# Patient Record
Sex: FEMALE | Race: WHITE | Employment: FULL TIME | URBAN - METROPOLITAN AREA
[De-identification: names, ages, dates, MRNs, and addresses within clinical notes are randomized per-mention and may not be internally consistent; named-entity substitution may affect disease eponyms.]

---

## 2018-01-01 ENCOUNTER — HOSPICE ADMISSION (OUTPATIENT)
Dept: HOSPICE | Facility: HOSPICE | Age: 68
End: 2018-01-01
Payer: OTHER MISCELLANEOUS

## 2018-01-01 ENCOUNTER — HOSPITAL ENCOUNTER (INPATIENT)
Age: 68
LOS: 4 days | End: 2018-03-09
Attending: INTERNAL MEDICINE | Admitting: INTERNAL MEDICINE

## 2018-01-01 VITALS
TEMPERATURE: 103.9 F | SYSTOLIC BLOOD PRESSURE: 99 MMHG | DIASTOLIC BLOOD PRESSURE: 59 MMHG | HEART RATE: 112 BPM | RESPIRATION RATE: 12 BRPM

## 2018-01-01 PROCEDURE — 74011250636 HC RX REV CODE- 250/636: Performed by: NURSE PRACTITIONER

## 2018-01-01 PROCEDURE — 74011000250 HC RX REV CODE- 250: Performed by: NURSE PRACTITIONER

## 2018-01-01 PROCEDURE — 74011250637 HC RX REV CODE- 250/637: Performed by: NURSE PRACTITIONER

## 2018-01-01 PROCEDURE — 0656 HSPC GENERAL INPATIENT

## 2018-01-01 PROCEDURE — 3336500001 HSPC ELECTION

## 2018-01-01 RX ORDER — GLYCOPYRROLATE 0.2 MG/ML
0.2 INJECTION INTRAMUSCULAR; INTRAVENOUS
Status: DISCONTINUED | OUTPATIENT
Start: 2018-01-01 | End: 2018-01-01 | Stop reason: HOSPADM

## 2018-01-01 RX ORDER — ONDANSETRON HYDROCHLORIDE 8 MG/1
8 TABLET, FILM COATED ORAL
Status: ON HOLD | COMMUNITY
End: 2018-01-01

## 2018-01-01 RX ORDER — MORPHINE SULFATE 10 MG/ML
5 INJECTION, SOLUTION INTRAMUSCULAR; INTRAVENOUS
Status: DISCONTINUED | OUTPATIENT
Start: 2018-01-01 | End: 2018-01-01 | Stop reason: HOSPADM

## 2018-01-01 RX ORDER — MORPHINE SULFATE 30 MG/1
30 CAPSULE, EXTENDED RELEASE ORAL EVERY 12 HOURS
Status: ON HOLD | COMMUNITY
End: 2018-01-01

## 2018-01-01 RX ORDER — SODIUM CHLORIDE 0.9 % (FLUSH) 0.9 %
10 SYRINGE (ML) INJECTION AS NEEDED
Status: DISCONTINUED | OUTPATIENT
Start: 2018-01-01 | End: 2018-01-01 | Stop reason: HOSPADM

## 2018-01-01 RX ORDER — HEPARIN 100 UNIT/ML
300 SYRINGE INTRAVENOUS EVERY 12 HOURS
Status: DISCONTINUED | OUTPATIENT
Start: 2018-01-01 | End: 2018-01-01 | Stop reason: HOSPADM

## 2018-01-01 RX ORDER — HALOPERIDOL 5 MG/ML
2 INJECTION INTRAMUSCULAR
Status: DISCONTINUED | OUTPATIENT
Start: 2018-01-01 | End: 2018-01-01 | Stop reason: HOSPADM

## 2018-01-01 RX ORDER — LORAZEPAM 2 MG/ML
2 INJECTION INTRAMUSCULAR
Status: DISCONTINUED | OUTPATIENT
Start: 2018-01-01 | End: 2018-01-01 | Stop reason: HOSPADM

## 2018-01-01 RX ORDER — LORAZEPAM 1 MG/1
.5-1 TABLET ORAL
Status: ON HOLD | COMMUNITY
End: 2018-01-01

## 2018-01-01 RX ORDER — FACIAL-BODY WIPES
10 EACH TOPICAL AS NEEDED
Status: DISCONTINUED | OUTPATIENT
Start: 2018-01-01 | End: 2018-01-01

## 2018-01-01 RX ORDER — GABAPENTIN 300 MG/1
300 CAPSULE ORAL 3 TIMES DAILY
Status: ON HOLD | COMMUNITY
End: 2018-01-01

## 2018-01-01 RX ORDER — MORPHINE SULFATE 20 MG/ML
5 SOLUTION ORAL
COMMUNITY

## 2018-01-01 RX ORDER — SODIUM CHLORIDE 0.9 % (FLUSH) 0.9 %
10 SYRINGE (ML) INJECTION EVERY 12 HOURS
Status: DISCONTINUED | OUTPATIENT
Start: 2018-01-01 | End: 2018-01-01 | Stop reason: HOSPADM

## 2018-01-01 RX ORDER — ACETAMINOPHEN 650 MG/1
650 SUPPOSITORY RECTAL
Status: DISCONTINUED | OUTPATIENT
Start: 2018-01-01 | End: 2018-01-01 | Stop reason: HOSPADM

## 2018-01-01 RX ORDER — LORAZEPAM 2 MG/ML
2 CONCENTRATE ORAL
COMMUNITY

## 2018-01-01 RX ORDER — DIPHENOXYLATE HYDROCHLORIDE AND ATROPINE SULFATE 2.5; .025 MG/1; MG/1
1-2 TABLET ORAL
Status: ON HOLD | COMMUNITY
End: 2018-01-01

## 2018-01-01 RX ORDER — OXYCODONE HYDROCHLORIDE 10 MG/1
10 TABLET ORAL
Status: ON HOLD | COMMUNITY
End: 2018-01-01

## 2018-01-01 RX ORDER — FENTANYL 50 UG/1
1 PATCH TRANSDERMAL
Status: DISCONTINUED | OUTPATIENT
Start: 2018-01-01 | End: 2018-01-01 | Stop reason: HOSPADM

## 2018-01-01 RX ORDER — HEPARIN 100 UNIT/ML
300 SYRINGE INTRAVENOUS AS NEEDED
Status: DISCONTINUED | OUTPATIENT
Start: 2018-01-01 | End: 2018-01-01 | Stop reason: HOSPADM

## 2018-01-01 RX ORDER — FENTANYL 25 UG/1
1 PATCH TRANSDERMAL
COMMUNITY

## 2018-01-01 RX ADMIN — LORAZEPAM 2 MG: 2 INJECTION INTRAMUSCULAR; INTRAVENOUS at 03:46

## 2018-01-01 RX ADMIN — MORPHINE SULFATE 5 MG: 10 INJECTION INTRAVENOUS at 20:26

## 2018-01-01 RX ADMIN — SODIUM CHLORIDE, PRESERVATIVE FREE 10 ML: 5 INJECTION INTRAVENOUS at 12:19

## 2018-01-01 RX ADMIN — MORPHINE SULFATE 5 MG: 10 INJECTION INTRAVENOUS at 20:48

## 2018-01-01 RX ADMIN — LORAZEPAM 2 MG: 2 INJECTION INTRAMUSCULAR; INTRAVENOUS at 09:55

## 2018-01-01 RX ADMIN — Medication 300 UNITS: at 09:00

## 2018-01-01 RX ADMIN — HALOPERIDOL LACTATE 2 MG: 5 INJECTION, SOLUTION INTRAMUSCULAR at 16:30

## 2018-01-01 RX ADMIN — Medication 300 UNITS: at 08:39

## 2018-01-01 RX ADMIN — LORAZEPAM 2 MG: 2 INJECTION INTRAMUSCULAR; INTRAVENOUS at 09:12

## 2018-01-01 RX ADMIN — MORPHINE SULFATE 5 MG: 10 INJECTION INTRAVENOUS at 09:02

## 2018-01-01 RX ADMIN — MORPHINE SULFATE 5 MG: 10 INJECTION INTRAVENOUS at 06:25

## 2018-01-01 RX ADMIN — Medication 300 UNITS: at 20:25

## 2018-01-01 RX ADMIN — LORAZEPAM 2 MG: 2 INJECTION INTRAMUSCULAR; INTRAVENOUS at 13:58

## 2018-01-01 RX ADMIN — HALOPERIDOL LACTATE 2 MG: 5 INJECTION, SOLUTION INTRAMUSCULAR at 09:02

## 2018-01-01 RX ADMIN — MORPHINE SULFATE 5 MG: 10 INJECTION INTRAVENOUS at 16:37

## 2018-01-01 RX ADMIN — MORPHINE SULFATE 5 MG: 10 INJECTION INTRAVENOUS at 13:58

## 2018-01-01 RX ADMIN — MORPHINE SULFATE 5 MG: 10 INJECTION INTRAVENOUS at 05:25

## 2018-01-01 RX ADMIN — Medication 300 UNITS: at 20:47

## 2018-01-01 RX ADMIN — LORAZEPAM 2 MG: 2 INJECTION INTRAMUSCULAR; INTRAVENOUS at 18:09

## 2018-01-01 RX ADMIN — MORPHINE SULFATE 5 MG: 10 INJECTION INTRAVENOUS at 07:58

## 2018-01-01 RX ADMIN — SODIUM CHLORIDE, PRESERVATIVE FREE 10 ML: 5 INJECTION INTRAVENOUS at 09:13

## 2018-01-01 RX ADMIN — SODIUM CHLORIDE, PRESERVATIVE FREE 10 ML: 5 INJECTION INTRAVENOUS at 16:31

## 2018-01-01 RX ADMIN — MORPHINE SULFATE 5 MG: 10 INJECTION INTRAVENOUS at 17:41

## 2018-01-01 RX ADMIN — MORPHINE SULFATE 5 MG: 10 INJECTION INTRAVENOUS at 22:00

## 2018-01-01 RX ADMIN — MORPHINE SULFATE 5 MG: 10 INJECTION INTRAVENOUS at 01:44

## 2018-01-01 RX ADMIN — MORPHINE SULFATE 5 MG: 10 INJECTION INTRAVENOUS at 02:04

## 2018-01-01 RX ADMIN — MORPHINE SULFATE 5 MG: 10 INJECTION INTRAVENOUS at 12:40

## 2018-01-01 RX ADMIN — MORPHINE SULFATE 5 MG: 10 INJECTION INTRAVENOUS at 05:00

## 2018-01-01 RX ADMIN — MORPHINE SULFATE 5 MG: 10 INJECTION INTRAVENOUS at 18:31

## 2018-01-01 RX ADMIN — LORAZEPAM 2 MG: 2 INJECTION INTRAMUSCULAR; INTRAVENOUS at 12:16

## 2018-01-01 RX ADMIN — SODIUM CHLORIDE, PRESERVATIVE FREE 300 UNITS: 5 INJECTION INTRAVENOUS at 09:56

## 2018-01-01 RX ADMIN — LORAZEPAM 2 MG: 2 INJECTION INTRAMUSCULAR; INTRAVENOUS at 07:53

## 2018-01-01 RX ADMIN — LORAZEPAM 2 MG: 2 INJECTION INTRAMUSCULAR; INTRAVENOUS at 18:30

## 2018-01-01 RX ADMIN — MORPHINE SULFATE 5 MG: 10 INJECTION INTRAVENOUS at 09:53

## 2018-01-01 RX ADMIN — GLYCOPYRROLATE 0.2 MG: 0.2 INJECTION INTRAMUSCULAR; INTRAVENOUS at 09:00

## 2018-01-01 RX ADMIN — ACETAMINOPHEN 650 MG: 650 SUPPOSITORY RECTAL at 23:00

## 2018-01-01 RX ADMIN — MORPHINE SULFATE 5 MG: 10 INJECTION INTRAVENOUS at 14:38

## 2018-01-01 RX ADMIN — LORAZEPAM 2 MG: 2 INJECTION INTRAMUSCULAR; INTRAVENOUS at 22:06

## 2018-01-01 RX ADMIN — LORAZEPAM 2 MG: 2 INJECTION INTRAMUSCULAR; INTRAVENOUS at 22:42

## 2018-01-01 RX ADMIN — SODIUM CHLORIDE, PRESERVATIVE FREE 10 ML: 5 INJECTION INTRAVENOUS at 20:47

## 2018-01-01 RX ADMIN — SODIUM CHLORIDE, PRESERVATIVE FREE 10 ML: 5 INJECTION INTRAVENOUS at 20:25

## 2018-01-01 RX ADMIN — LORAZEPAM 2 MG: 2 INJECTION INTRAMUSCULAR; INTRAVENOUS at 08:39

## 2018-01-01 RX ADMIN — SODIUM CHLORIDE, PRESERVATIVE FREE 10 ML: 5 INJECTION INTRAVENOUS at 18:12

## 2018-01-01 RX ADMIN — MORPHINE SULFATE 5 MG: 10 INJECTION INTRAVENOUS at 12:17

## 2018-01-01 RX ADMIN — LORAZEPAM 2 MG: 2 INJECTION INTRAMUSCULAR; INTRAVENOUS at 17:34

## 2018-01-01 RX ADMIN — Medication 300 UNITS: at 08:00

## 2018-01-01 RX ADMIN — SODIUM CHLORIDE, PRESERVATIVE FREE 10 ML: 5 INJECTION INTRAVENOUS at 20:09

## 2018-01-01 RX ADMIN — SODIUM CHLORIDE, PRESERVATIVE FREE 10 ML: 5 INJECTION INTRAVENOUS at 09:56

## 2018-01-01 RX ADMIN — LORAZEPAM 2 MG: 2 INJECTION INTRAMUSCULAR; INTRAVENOUS at 14:42

## 2018-01-01 RX ADMIN — MORPHINE SULFATE 5 MG: 10 INJECTION INTRAVENOUS at 03:37

## 2018-01-01 RX ADMIN — ACETAMINOPHEN 650 MG: 650 SUPPOSITORY RECTAL at 16:31

## 2018-01-01 RX ADMIN — Medication 300 UNITS: at 20:09

## 2018-01-01 RX ADMIN — MORPHINE SULFATE 5 MG: 10 INJECTION INTRAVENOUS at 16:51

## 2018-01-01 RX ADMIN — MORPHINE SULFATE 5 MG: 10 INJECTION INTRAVENOUS at 20:44

## 2018-01-01 RX ADMIN — ACETAMINOPHEN 650 MG: 650 SUPPOSITORY RECTAL at 05:26

## 2018-01-01 RX ADMIN — LORAZEPAM 2 MG: 2 INJECTION INTRAMUSCULAR; INTRAVENOUS at 12:41

## 2018-01-01 RX ADMIN — MORPHINE SULFATE 5 MG: 10 INJECTION INTRAVENOUS at 04:24

## 2018-01-01 RX ADMIN — MORPHINE SULFATE 5 MG: 10 INJECTION INTRAVENOUS at 18:09

## 2018-01-01 RX ADMIN — Medication 300 UNITS: at 22:00

## 2018-01-01 RX ADMIN — LORAZEPAM 2 MG: 2 INJECTION INTRAMUSCULAR; INTRAVENOUS at 22:01

## 2018-01-01 RX ADMIN — SODIUM CHLORIDE, PRESERVATIVE FREE 10 ML: 5 INJECTION INTRAVENOUS at 22:00

## 2018-01-01 RX ADMIN — SODIUM CHLORIDE, PRESERVATIVE FREE 10 ML: 5 INJECTION INTRAVENOUS at 12:45

## 2018-01-01 RX ADMIN — MORPHINE SULFATE 5 MG: 10 INJECTION INTRAVENOUS at 16:30

## 2018-01-01 RX ADMIN — LORAZEPAM 2 MG: 2 INJECTION INTRAMUSCULAR; INTRAVENOUS at 02:04

## 2018-01-01 RX ADMIN — LORAZEPAM 2 MG: 2 INJECTION INTRAMUSCULAR; INTRAVENOUS at 06:25

## 2018-01-01 RX ADMIN — MORPHINE SULFATE 5 MG: 10 INJECTION INTRAVENOUS at 21:59

## 2018-01-01 RX ADMIN — MORPHINE SULFATE 5 MG: 10 INJECTION INTRAVENOUS at 08:40

## 2018-01-01 RX ADMIN — MORPHINE SULFATE 5 MG: 10 INJECTION INTRAVENOUS at 09:13

## 2018-01-01 RX ADMIN — SODIUM CHLORIDE, PRESERVATIVE FREE 10 ML: 5 INJECTION INTRAVENOUS at 08:00

## 2018-01-01 RX ADMIN — SODIUM CHLORIDE, PRESERVATIVE FREE 10 ML: 5 INJECTION INTRAVENOUS at 08:40

## 2018-03-05 PROBLEM — C79.51 BONE METASTASIS (HCC): Status: ACTIVE | Noted: 2017-01-01

## 2018-03-05 PROBLEM — H52.4 MYOPIA WITH PRESBYOPIA OF BOTH EYES: Status: ACTIVE | Noted: 2017-01-01

## 2018-03-05 PROBLEM — H25.13 CATARACT, NUCLEAR SCLEROTIC, BOTH EYES: Status: ACTIVE | Noted: 2017-01-01

## 2018-03-05 PROBLEM — H52.13 MYOPIA WITH PRESBYOPIA OF BOTH EYES: Status: ACTIVE | Noted: 2017-01-01

## 2018-03-05 PROBLEM — H52.222 REGULAR ASTIGMATISM OF LEFT EYE: Status: ACTIVE | Noted: 2017-01-01

## 2018-03-05 PROBLEM — R52 UNCONTROLLED PAIN: Status: ACTIVE | Noted: 2018-01-01

## 2018-03-05 PROBLEM — K83.8 COMMON BILE DUCT DILATION: Status: ACTIVE | Noted: 2017-01-01

## 2018-03-05 PROBLEM — K83.1 BILE DUCT OBSTRUCTION: Status: ACTIVE | Noted: 2017-01-01

## 2018-03-05 PROBLEM — C18.9 MALIGNANT NEOPLASM OF COLON (HCC): Status: ACTIVE | Noted: 2017-01-01

## 2018-03-05 PROBLEM — K63.5 COLON POLYPS: Status: ACTIVE | Noted: 2017-01-01

## 2018-03-05 PROBLEM — D13.5 AMPULLARY ADENOMA: Status: ACTIVE | Noted: 2017-01-01

## 2018-03-05 NOTE — H&P
History and Physical    Patient: Rodrick Kaur MRN: [id number]  SSN: [id number]    YOB: 1950  Age: 75 y.o. Sex: female      Subjective:      Rordick Kaur is a 75 y.o. female who has a hospice diagnosis of malignant neoplasm of rectum. Hospice associated diagnoses are colon polyp, obstruction of bile duct, other specified diseases of the biliary tract, malignant neoplasm of the sigmoid colon, secondary malignant neoplasm of the bone and overactive bladder. She is a patient of [facility]. We were contacted to assist the patient with inpatient admission for symptom management. She had been taking MS contin at home for pain but began to have difficulty swallowing. She was changed to Roxanol and Ativan intensol due to her swallowing issues. Yesterday she began to have more difficulty with swallowing. Attempts were made to give sublingual medications, but the medication drained out of her mouth. Her daughter said that she was having pain that was worsened by movement in bed to provide incontinent care. Since she is unable to tolerate sublingual medications, we will access her port in order to give her parenteral medications for symptom management. Patient admitted GIP with metastatic rectal cancer for management of pain, dyspnea and agitation. Past Medical History:   Diagnosis Date    Anxiety     Asthma     Colorectal cancer ([facility] [address] 2015    S/P chemo, radiation, surgery, 2017 metastasis to lungs, hip, spine per patient    Colostomy present ([facility] [address]     Depression     H/O seasonal allergies     Hypertension     Menopause     Nerve disorder     Neuropathy     Reflux gastritis     Scarlet fever age 8     Past Surgical History:   Procedure Laterality Date    HX  SECTION      HX CHOLECYSTECTOMY      HX COLECTOMY  2015    abdominoperineal proctocolectomy with mesh reinforcement. Colostomy.     HX COLONOSCOPY      2014, 16-tubular adenoma in fragments    HX COLOSTOMY  2015    HX OTHER SURGICAL  04/2015    vaginal septum resection    HX OTHER SURGICAL      ureter repair after ureter stitched to incision during c section    HX TONSILLECTOMY      at age 7      Family History   Problem Relation Age of Onset    Diabetes Brother     Liver Disease Mother      cirrhosis    Arthritis-rheumatoid Father     Cancer Father      colon    Crohn's Disease Daughter      Social History   Substance Use Topics    Smoking status: Never Smoker    Smokeless tobacco: Never Used    Alcohol use No      Prior to Admission medications    Medication Sig Start Date End Date Taking? Authorizing Provider   morphine (ROXANOL) 100 mg/5 mL (20 mg/mL) concentrated solution Take 5 mg by mouth every three (3) hours as needed for Pain. Dose unknown. Was waiting to receive med. Historical Provider   LORazepam (INTENSOL) 2 mg/mL concentrated solution Take 2 mg by mouth every three (3) hours as needed for Anxiety. Historical Provider   fentaNYL (DURAGESIC) 25 mcg/hr PATCH 1 Patch by TransDERmal route every seventy-two (72) hours. Historical Provider        Allergies   Allergen Reactions    Biaxin [Clarithromycin] Unknown (comments) and Nausea and Vomiting    Ciprofloxacin Nausea and Vomiting    Codeine Nausea and Vomiting    Demerol [Meperidine] Unknown (comments) and Nausea and Vomiting    Hycodan [Hydrocodone-Homatropine] Nausea and Vomiting    Proventil [Albuterol] Unknown (comments) and Nausea and Vomiting    Quinolones Unknown (comments) and Nausea and Vomiting    Sulfa (Sulfonamide Antibiotics) Unknown (comments) and Nausea and Vomiting       Review of Systems:  Review of systems not obtained due to patient factors.     Objective:     Vitals:    03/05/18 1602 03/06/18 0425   BP: 100/65 94/66   Pulse: (!) 123 (!) 106   Resp: 18 16   Temp: 98.5 °F (36.9 °C) 98 °F (36.7 °C)        Physical Exam:  GENERAL: mild distress, appears stated age, cachectic, confused  LUNG: clear to auscultation bilaterally, diminished breath sounds R base, L base with unlabored respirations. 5-10 second periods of apnea. HEART: regular rate and rhythm, S1, S2 normal, no murmur, click, rub or gallop  ABDOMEN: soft, non-tender. Bowel sounds hypoactive. Mid abdomen colostomy. : Incontinent. EXTREMITIES:  extremities with mild generalized edema. Weak pulses. SKIN: Warm to touch. Stage 2 to right buttock. NEUROLOGIC: Confused. Unable to answer questions. Bedbound. PSYCHIATRIC: agitated at times.      Assessment:     Hospital Problems  Date Reviewed: 3/5/2018          Codes Class Noted POA    Uncontrolled pain ICD-10-CM: R52  ICD-9-CM: 780.96  3/5/2018 Unknown        * (Principal)Malignant neoplasm of rectum ([facility] [address] ICD-10-CM: C20  ICD-9-CM: 154.1  4/2/2015 Yes    Overview Signed 4/2/2015 10:57 AM by Poncho Thomas NP     Diagnosed 1/2015--S/P chemo and radiation                   Plan:     Current Facility-Administered Medications   Medication Dose Route Frequency    fentaNYL (DURAGESIC) 50 mcg/hr patch 1 Patch  1 Patch TransDERmal Q72H    morphine injection 5 mg  5 mg SubCUTAneous Q20MIN PRN    Or    morphine injection 5 mg  5 mg IntraVENous Q20MIN PRN    haloperidol lactate (HALDOL) injection 2 mg  2 mg SubCUTAneous Q1H PRN    Or    haloperidol lactate (HALDOL) injection 2 mg  2 mg IntraVENous Q1H PRN    acetaminophen (TYLENOL) suppository 650 mg  650 mg Rectal Q3H PRN    glycopyrrolate (ROBINUL) injection 0.2 mg  0.2 mg IntraVENous Q4H PRN    haloperidol lactate (HALDOL) injection 2 mg  2 mg SubCUTAneous Q1H PRN    Or    haloperidol lactate (HALDOL) injection 2 mg  2 mg IntraVENous Q1H PRN    LORazepam (ATIVAN) injection 2 mg  2 mg IntraVENous Q2H PRN    sodium chloride (NS) flush 10 mL  10 mL InterCATHeter Q12H    heparin (porcine) pf 300 Units  300 Units InterCATHeter Q12H    sodium chloride (NS) flush 10 mL  10 mL InterCATHeter PRN    heparin (porcine) pf 300 Units  300 Units InterCATHeter PRN       Admitted GIP with metastatic malignant neoplasm of the rectum for management of pain and anxiety. 1. Pain: Morphine as ordered. Fentanyl patch as ordered. Increase home dose to 50mcg from 25mcg. 2. Anxiety: Haloperidol and Lorazepam as ordered. 3. Family/Pt Support: Family at bedside during exam. Medications and plan of care discussed with nursing staff and family. Will continue to monitor for symptoms and adjust medications as needed to maintain patient comfort. PPS 20%. Case discussed with Dr. Christina Davenport.     Signed By: Regina Villarreal NP     March 6, 2018

## 2018-03-05 NOTE — PROGRESS NOTES
Pt arrives to facility via EMS accompanied by daughter. Patient states in pain. Unable to scale. Per [facility] pain 4-5/10/ Pt port accessed, per order, to administer IV medication. Per jerrod pt has been in pain crisis greater than 24 hours with no relief of medication changes. Morphine 5mg IVP given at 1637 and 1734. Pt fentanyl patch increased to 50mcg and applied to right upper arm. Pt noted restlessness and anxiety. Lorazepam also administered at 1734. Pt is able to recite birthday, however continued conversation pt was confuse. Per pt daughter pt with increased confusion past week. . Pt did require Ativan for increased restlessness associated with pain. Pt respirations non labored with clear lung sounds. Patient abdomin soft and non tender. Hypoactive bowel sounds. Per dgt, pt intake poor with sip only past several days. Pressure wound to right buttock stage II and left heel stage I/ Pt was ambulatory with assist of walker 1 week ago. Now is bed bound. Pt unable to safely swallow medication and will require IVP.  Education provided to family related to pain management, medications and POC goals

## 2018-03-06 NOTE — PROGRESS NOTES
Received report from off going RN and assumed care of patient. Patient identified by name and . Patient in bed resting quietly. Family at bed side. No s/sx of distress noted at this time. Bed low and locked. Call light within reach. Door open for monitoring.

## 2018-03-06 NOTE — PROGRESS NOTES
Attempted to place blackburn per order x 2 unsuccessful. Per patient's daughter, the home health nurse had not been able to place blackburn either.

## 2018-03-07 NOTE — MED STUDENT NOTES
Progress Note    Patient: Danny Bradford MRN: [id number]  SSN: [id number]    YOB: 1950  Age: 75 y.o. Sex: female      Admit Date: 3/5/2018    LOS: 2 days     Subjective:     Patient required x1 haldol and x1 ativan for agitation as well as x8 morphine for pain in the past 24 hours. Daughter at bedside states patient has been resting comfortably. She states pain is easily elicited with any movement, but managed well with prns. No change in mentation. Review of Systems:  Review of systems not obtained due to patient factors. Objective:     Vitals:    03/06/18 1000 03/06/18 1624 03/06/18 2300 03/07/18 0514   BP: (!) 81/60 (!) 82/60 92/61 (!) 84/59   Pulse: 100 (!) 103 (!) 102 (!) 108   Resp:    14   Temp:  98.5 °F (36.9 °C)  100.2 °F (37.9 °C)        Intake and Output:  Current Shift:    Last three shifts: 03/05 1901 - 03/07 0700  In: 0   Out: 600 [Urine:600]    Physical Exam:   GENERAL: appears stated age, resting comfortably   LUNG: clear to auscultation bilaterally, unlabored effort   HEART: tachycardic, regular rhythm, no murmur, click, rub or gallop  ABDOMEN: soft, non-tender. Normal bowel sounds. Colostomy in place with no output. No surrounding erythema or drainage. EXTREMITIES: no cyanosis. Mild edema to lower extremities. Weak dp/pt pulse. Compression stalking over right upper extremity   SKIN: Warm to touch. Wound to sacral area, see nursing notes for details. NEUROLOGIC: unresponsive to verbal or tactile stimuli     Lab/Data Review:  No new labs resulted in the last 24 hours.       Assessment:     Principal Problem:    Malignant neoplasm of rectum ([address] (4/2/2015)      Overview: Diagnosed 1/2015--S/P chemo and radiation    Active Problems:    Uncontrolled pain (3/5/2018)        Plan:     Current Facility-Administered Medications   Medication Dose Route Frequency    fentaNYL (DURAGESIC) 50 mcg/hr patch 1 Patch  1 Patch TransDERmal Q72H    morphine injection 5 mg  5 mg SubCUTAneous Q20MIN PRN    Or    morphine injection 5 mg  5 mg IntraVENous Q20MIN PRN    haloperidol lactate (HALDOL) injection 2 mg  2 mg SubCUTAneous Q1H PRN    Or    haloperidol lactate (HALDOL) injection 2 mg  2 mg IntraVENous Q1H PRN    acetaminophen (TYLENOL) suppository 650 mg  650 mg Rectal Q3H PRN    glycopyrrolate (ROBINUL) injection 0.2 mg  0.2 mg IntraVENous Q4H PRN    haloperidol lactate (HALDOL) injection 2 mg  2 mg SubCUTAneous Q1H PRN    Or    haloperidol lactate (HALDOL) injection 2 mg  2 mg IntraVENous Q1H PRN    LORazepam (ATIVAN) injection 2 mg  2 mg IntraVENous Q2H PRN    sodium chloride (NS) flush 10 mL  10 mL InterCATHeter Q12H    heparin (porcine) pf 300 Units  300 Units InterCATHeter Q12H    sodium chloride (NS) flush 10 mL  10 mL InterCATHeter PRN    heparin (porcine) pf 300 Units  300 Units InterCATHeter PRN     Pain: Morphine as ordered. Fentanyl patch as ordered. Consider increasing dose of fentanyl patch as patient has required x8 morphine 5 mg in the past 24 hours.       Anxiety: Haldol and ativan as ordered    Daughter at bedside. Daughter informed of patient's declining status given recent vitals. Death is likely within next 48 hours. Will continue to monitor symptoms and manage meds as necessary for comfort. Signed By: Xavi Padgett     March 7, 2018          *ATTENTION:  This note has been created by a medical student for educational purposes only. Please do not refer to the content of this note for clinical decision-making, billing, or other purposes. Please see attending physicians note to obtain clinical information on this patient. *

## 2018-03-07 NOTE — PROGRESS NOTES
8621- Report taken from the off going RN and walking rounds completed. The patient was identified by name and . She shows no signs of anxiety, SOB, nausea/ vomit or pain. The bed is low and locked and the call light is within the reach of family, who is at the bedside. The tab alert is in place. 8981- Medicated for anxiety and dyspnea. 2089- Patient is now resting quietly with no s/sx of distress observed. 4235- The patient is dyspneic and anxious. Medicated. No s /sx of distress. 0245- The patient is now resting quietly. No signs of distress observed. 1240- Medicated for dyspnea and anxiety. Repositioned the patient . 1300- Patient is resting quietly in the bed with no signs of distress. 1500- The patient continues to rest with no signs of distress observed. Family remains at the bedside. 1630- Tylenol suppository for temp 101. 6. Morphine for pain a and Haldol for anxiety. 1700- Patient is resting quietly. No signs of restlessness. 1809- Administered Morphine for pain and Lorazepam for restlessness. 1830- Resting quietly now. No signs of anxiety or restlessness or pain. Will report off to the on coming RN and complete walking rounds.

## 2018-03-08 NOTE — PROGRESS NOTES
Progress Note    Patient: Wei Lamb MRN: [id number]  SSN: [id number]    YOB: 1950  Age: 75 y.o. Sex: female      Admit Date: 3/5/2018    LOS: 3 days     Subjective:     Responsive only to pain. Appears comfortable at present but has been medicated for pain, dyspnea, and anxiety/restlessness with morphine and ativan. No oral intake and appears to be active. Family at bedside off and on throughout the day. Review of Systems:  Pertinent items are noted in the History of Present Illness. Objective:     Vitals:    03/07/18 2300 03/08/18 0005 03/08/18 0423 03/08/18 1406   BP:   (!) 68/49 (!) 83/66   Pulse:   (!) 115 100   Resp:   14 12   Temp: (!) 100.9 °F (38.3 °C) 98.9 °F (37.2 °C) 100 °F (37.8 °C) 97.2 °F (36.2 °C)        Intake and Output:  Current Shift: 03/08 0701 - 03/08 1900  In: -   Out: 150 [Urine:150]  Last three shifts: 03/06 1901 - 03/08 0700  In: 0   Out: 900 [Urine:900]     Physical Exam:  GENERAL: no distress, cachectic  LUNG: diminished breath sounds. Periods of dyspnea and apnea lasting 15 seconds observed. HEART: regular rate and rhythm, S1, S2 normal, no murmur, click, rub or gallop  ABDOMEN: soft, non-tender. Bowel sounds absent. + colostomy without any output. SKIN: Hot but dry to touch. Stage 2 to right buttock per nursing. NEUROLOGIC: Poorly responsive. Bedbound. PSYCHIATRIC: agitated and restless at times. Lab/Data Review:  No new labs resulted in the last 24 hours.       Assessment:     Principal Problem:    Malignant neoplasm of rectum ([address] (4/2/2015)      Overview: Diagnosed 1/2015--S/P chemo and radiation    Active Problems:    Uncontrolled pain (3/5/2018)        Plan:     Current Facility-Administered Medications   Medication Dose Route Frequency    fentaNYL (DURAGESIC) 50 mcg/hr patch 1 Patch  1 Patch TransDERmal Q72H    morphine injection 5 mg  5 mg SubCUTAneous Q20MIN PRN    Or    morphine injection 5 mg  5 mg IntraVENous Q20MIN PRN    haloperidol lactate (HALDOL) injection 2 mg  2 mg SubCUTAneous Q1H PRN    Or    haloperidol lactate (HALDOL) injection 2 mg  2 mg IntraVENous Q1H PRN    acetaminophen (TYLENOL) suppository 650 mg  650 mg Rectal Q3H PRN    glycopyrrolate (ROBINUL) injection 0.2 mg  0.2 mg IntraVENous Q4H PRN    haloperidol lactate (HALDOL) injection 2 mg  2 mg SubCUTAneous Q1H PRN    Or    haloperidol lactate (HALDOL) injection 2 mg  2 mg IntraVENous Q1H PRN    LORazepam (ATIVAN) injection 2 mg  2 mg IntraVENous Q2H PRN    sodium chloride (NS) flush 10 mL  10 mL InterCATHeter Q12H    heparin (porcine) pf 300 Units  300 Units InterCATHeter Q12H    sodium chloride (NS) flush 10 mL  10 mL InterCATHeter PRN    heparin (porcine) pf 300 Units  300 Units InterCATHeter PRN     1. Admitted GIP for dx of rectal cancer for management of pain and family/pt support. Primary hospice is [facility]. 2. Pain: Fentanyl 50 mcg/hr via TD patch and PRN morphine. 3. Family/Pt support: No family at bedside during exam. Discussed ongoing plan of care with primary RN. Appears to be actively approaching her demise with death anticipated within the next 24-48 hours. Continue to monitor and palliate symptoms as they arise.  PPS 10%    Signed By: Stevens Cranker, NP     March 8, 2018

## 2018-03-08 NOTE — PROGRESS NOTES
9929- The patient shift report was taken and walking rounds completed. Patient was identified by name and . She shows no signs of anxiety, SOB, nausea/ vomit or pain at this time. The bed is low and locked and the bed rails are up times two and the door to the room is opened so staff can hear patient as patient is alone. 0900- Administered Lorazepam 2 mg IV for restlessness and Morphine 5 mg IV for pain 5/10. The patient is being bathed at this time. The daughter is at the bedside. 4002- The patient is now resting quietly in the bed with no signs of distress observed. 1350- Administered Lorazepam for restlessness and Morphine for dyspnea. 1410-The patient remains lethargic. No signs of distress. Family remains at the bedside. 1700- Repositioned for comfort. No signs of distress    1830- Administered Lorazepam 2 mg for restlessness and Morphine for dyspnea. 1900- The patient is quietly resting in the bed. No s /sx of distress. Will report off to the on coming RN and complete walking rounds.

## 2018-03-09 NOTE — [facility] IDG VOLUNTEER NOTES
[address] Review     Status Codes I = Initiated C=Continued R=Revised RS = Resolved     I.  Volunteer     Goal: Hospice house volunteer (s) enhances the quality of remaining life while patient is at the hospice house. Interventions: [place] Volunteer (s) will provide companionship to the patient and/or family by visiting at the hospice house       . Edwin [place] Volunteer (s) will provide respite as needed when requested by patient and/or family. Edwin Schreiber [place]  Volunteer will provide activities such as music, reading, pet therapy, etc. as requested. [place]ovan Mau Schreiber [place]  Comfort bag delivered. Any other special requests or information regarding volunteer services:    Five visits for companionship and volunteer nursing assistance is recorded. No further needs identified at this time. These notes have been discussed in [facility] meeting.           Signed by: Ron Byers

## 2018-03-09 NOTE — [facility] IDG CHAPLAIN NOTES
Patient: Elizabeth Howard    Date: 03/09/18  Time: 1:08 PM    [facility]  Notes:    Patient admitted to hospice house on 3/5/18. Primary spiritual care and support being provided by [address].  Jasmin Mishra made complementary visit on 3/8/18. Support provided to long-time friend through ministry of presence and active listening/life review. Patient's daughter was to return to [facility] later same day. Additional supplemental spiritual care and support to be provided as needed, requested, or referred.           Signed by: Nuno Levine

## 2018-03-09 NOTE — PROGRESS NOTES
8746- The patient report taken from the off going RN and walking rounds completed. The patient identified by name and . No signs of pain, SOB, nausea / vomit or anxiety. The bed is low and locked and the tab alert is in place. The patient 's daughter is sleeping on the bedside cot. 4259- Lorazepam and Morphine administered prior to her bath. 0900- Resting quietly now. 1217- Administered Lorazepam for anxiety and Morphine for dyspnea. 1245- Resting quietly now. 1528- The patient has no obtainable vital signs.

## 2018-03-09 NOTE — [facility] IDG SOCIAL WORKER NOTES
Patient: Annel Tom    Date: 03/09/18  Time: 12:04 PM    [facility]  Notes  Pt is here GIP from a contracted hospice. Pt is active and there are no discharge plans at this time.   IDG team called home hospice team.          Signed by: Gregorio Dinh

## 2018-03-09 NOTE — PROGRESS NOTES
LATE ENTRY:    Situation:  Patient minimally responsive, apparently comfortable, in bed. Patient's long-time friend, William Castillo, at bedside. Background:  Patient is a 75year old female, admitted on 3/5/18. Family includes a daughter, who was expected to return to [facility] shortly after time of encounter. Assess/Response:  Support provided to patient's friend through ministry of presence, active listening/life review, words of encouragement and prayer. Patient's friend spoke of [id number] with patient, and of patient and pt's friend \"being there\" for each other through the years. Plan of care:  Patient is actually a contract patient, with primary spiritual care and support being provided by [address]. Supplemental spiritual care/support to be provided as needed, requested, or referred.

## 2018-03-09 NOTE — PROGRESS NOTES
PATIENT DEATH:    Ac Mcclain [facility] .  preceded to the room and offered condolences and expressed sympathy to family/friends, including patient's daughter and patient's long-time friend, Gerald Concepcion. Patient's son-in-law also present during closing moments of encounter. Support provided through [employer] of presence, listening, and prayer. Family is coping in aris, and taking comfort in the fact that patient suffers no more.  explained bereavement support available through [facility] and encouraged family to utilize bereavement support as needed or desired.

## 2018-03-09 NOTE — [facility] IDG NURSE NOTES
Patient: Tri Alatorre    Date: 03/09/18  Time: 1:07 PM    [facility] Nurse Notes  75 yoF GIP admitted for rectal CA for symptom control for pain. Pt has been comfortable and pain is managed. Currently febrile at 103.9 and only responsive to pain. Fentanyl 50 mcg in place. Ativan and Morphine PRN for symptoms. No med changes at this time. Jocelyn Patel is primary nurse with [facility] [address].          Signed by: Jania Mckeon RN

## 2018-03-10 PROCEDURE — 0656 HSPC GENERAL INPATIENT

## 2018-03-11 PROCEDURE — 0656 HSPC GENERAL INPATIENT
